# Patient Record
Sex: FEMALE | Race: WHITE
[De-identification: names, ages, dates, MRNs, and addresses within clinical notes are randomized per-mention and may not be internally consistent; named-entity substitution may affect disease eponyms.]

---

## 2019-03-13 ENCOUNTER — HOSPITAL ENCOUNTER (INPATIENT)
Dept: HOSPITAL 54 - ER | Age: 39
LOS: 2 days | Discharge: HOME | DRG: 760 | End: 2019-03-15
Attending: FAMILY MEDICINE | Admitting: NURSE PRACTITIONER
Payer: SELF-PAY

## 2019-03-13 VITALS — DIASTOLIC BLOOD PRESSURE: 74 MMHG | SYSTOLIC BLOOD PRESSURE: 114 MMHG

## 2019-03-13 VITALS — DIASTOLIC BLOOD PRESSURE: 65 MMHG | SYSTOLIC BLOOD PRESSURE: 108 MMHG

## 2019-03-13 VITALS — DIASTOLIC BLOOD PRESSURE: 70 MMHG | SYSTOLIC BLOOD PRESSURE: 110 MMHG

## 2019-03-13 VITALS — SYSTOLIC BLOOD PRESSURE: 108 MMHG | DIASTOLIC BLOOD PRESSURE: 68 MMHG

## 2019-03-13 VITALS — DIASTOLIC BLOOD PRESSURE: 82 MMHG | SYSTOLIC BLOOD PRESSURE: 108 MMHG

## 2019-03-13 VITALS — SYSTOLIC BLOOD PRESSURE: 108 MMHG | DIASTOLIC BLOOD PRESSURE: 64 MMHG

## 2019-03-13 VITALS — DIASTOLIC BLOOD PRESSURE: 65 MMHG | SYSTOLIC BLOOD PRESSURE: 110 MMHG

## 2019-03-13 VITALS — DIASTOLIC BLOOD PRESSURE: 70 MMHG | SYSTOLIC BLOOD PRESSURE: 108 MMHG

## 2019-03-13 VITALS — WEIGHT: 132 LBS | HEIGHT: 68 IN | BODY MASS INDEX: 20 KG/M2

## 2019-03-13 DIAGNOSIS — N84.0: ICD-10-CM

## 2019-03-13 DIAGNOSIS — N83.201: ICD-10-CM

## 2019-03-13 DIAGNOSIS — N83.202: ICD-10-CM

## 2019-03-13 DIAGNOSIS — D62: ICD-10-CM

## 2019-03-13 DIAGNOSIS — N93.9: Primary | ICD-10-CM

## 2019-03-13 DIAGNOSIS — N88.8: ICD-10-CM

## 2019-03-13 LAB
ALBUMIN SERPL BCP-MCNC: 4.1 G/DL (ref 3.4–5)
ALP SERPL-CCNC: 59 U/L (ref 46–116)
ALT SERPL W P-5'-P-CCNC: 18 U/L (ref 12–78)
AST SERPL W P-5'-P-CCNC: 13 U/L (ref 15–37)
BASOPHILS # BLD AUTO: 0.1 /CMM (ref 0–0.2)
BASOPHILS NFR BLD AUTO: 1.1 % (ref 0–2)
BILIRUB DIRECT SERPL-MCNC: 0.1 MG/DL (ref 0–0.2)
BILIRUB SERPL-MCNC: 0.3 MG/DL (ref 0.2–1)
BUN SERPL-MCNC: 10 MG/DL (ref 7–18)
CALCIUM SERPL-MCNC: 9.2 MG/DL (ref 8.5–10.1)
CHLORIDE SERPL-SCNC: 103 MMOL/L (ref 98–107)
CO2 SERPL-SCNC: 26 MMOL/L (ref 21–32)
CREAT SERPL-MCNC: 0.6 MG/DL (ref 0.6–1.3)
EOSINOPHIL NFR BLD AUTO: 2.6 % (ref 0–6)
GLUCOSE SERPL-MCNC: 91 MG/DL (ref 74–106)
HCT VFR BLD AUTO: 23 % (ref 33–45)
HCT VFR BLD AUTO: 26 % (ref 33–45)
HGB BLD-MCNC: 6.5 G/DL (ref 11.5–14.8)
HGB BLD-MCNC: 7.2 G/DL (ref 11.5–14.8)
LYMPHOCYTES NFR BLD AUTO: 2.5 /CMM (ref 0.8–4.8)
LYMPHOCYTES NFR BLD AUTO: 35.3 % (ref 20–44)
MCHC RBC AUTO-ENTMCNC: 28 G/DL (ref 31–36)
MCV RBC AUTO: 60 FL (ref 82–100)
MONOCYTES NFR BLD AUTO: 0.4 /CMM (ref 0.1–1.3)
MONOCYTES NFR BLD AUTO: 6.1 % (ref 2–12)
NEUTROPHILS # BLD AUTO: 3.8 /CMM (ref 1.8–8.9)
NEUTROPHILS NFR BLD AUTO: 54.9 % (ref 43–81)
PLATELET # BLD AUTO: 436 /CMM (ref 150–450)
POTASSIUM SERPL-SCNC: 3.6 MMOL/L (ref 3.5–5.1)
PROT SERPL-MCNC: 8.2 G/DL (ref 6.4–8.2)
RBC # BLD AUTO: 4.29 MIL/UL (ref 4–5.2)
SODIUM SERPL-SCNC: 139 MMOL/L (ref 136–145)
WBC NRBC COR # BLD AUTO: 7 K/UL (ref 4.3–11)

## 2019-03-13 PROCEDURE — 30233N1 TRANSFUSION OF NONAUTOLOGOUS RED BLOOD CELLS INTO PERIPHERAL VEIN, PERCUTANEOUS APPROACH: ICD-10-PCS | Performed by: FAMILY MEDICINE

## 2019-03-13 PROCEDURE — G0378 HOSPITAL OBSERVATION PER HR: HCPCS

## 2019-03-13 NOTE — NUR
-------------------------------------------------------------------------------

          *** Note undone in EDM - 03/13/19 at 2016 by PERLA ***           

-------------------------------------------------------------------------------

REPORT GIVEN TO RN RVI FOR RANDA, IV NS STILL INFUSING.

## 2019-03-13 NOTE — NUR
RECEIVED PATIENT FROM ER FOR DX VAGINAL BLEEDING. AO X 3, ABLE TO MAKE NEEDS KNOWN. NO ACUTE 
DISTRESS NOTED. DENIES ANY PAIN AT THIS TIME. VERY SCANT VAGINAL BLEEDING NOTED AT THIS 
TIME. SKIN INTACT. TELE READING SINUS RHYTHM HR 79. IV SITE PATENT, INTACT; FLUSHED. SAFETY 
REMINDERS GIVEN. ORIENTATION TO ROOM AND UNIT GIVEN TO PATIENT. ON LOW BED WITH BILATERAL 
UPPER SIDE RAILS UP. CALL BELL WITHIN EASY REACH. WILL CONTINUE TO MONITOR.

## 2019-03-13 NOTE — NUR
PT BIB SELF C/O vaginal bleeding x 7 dayS, PT IS AAOX4, NOT IN RESPIRATORY 
DISTRESS, V/S STABLE, KEPT RESTED AND COMFORTABLE.

## 2019-03-14 VITALS — DIASTOLIC BLOOD PRESSURE: 68 MMHG | SYSTOLIC BLOOD PRESSURE: 117 MMHG

## 2019-03-14 VITALS — SYSTOLIC BLOOD PRESSURE: 110 MMHG | DIASTOLIC BLOOD PRESSURE: 68 MMHG

## 2019-03-14 VITALS — DIASTOLIC BLOOD PRESSURE: 68 MMHG | SYSTOLIC BLOOD PRESSURE: 108 MMHG

## 2019-03-14 VITALS — DIASTOLIC BLOOD PRESSURE: 68 MMHG | SYSTOLIC BLOOD PRESSURE: 114 MMHG

## 2019-03-14 VITALS — SYSTOLIC BLOOD PRESSURE: 118 MMHG | DIASTOLIC BLOOD PRESSURE: 72 MMHG

## 2019-03-14 VITALS — SYSTOLIC BLOOD PRESSURE: 116 MMHG | DIASTOLIC BLOOD PRESSURE: 71 MMHG

## 2019-03-14 VITALS — SYSTOLIC BLOOD PRESSURE: 107 MMHG | DIASTOLIC BLOOD PRESSURE: 64 MMHG

## 2019-03-14 LAB
BASOPHILS # BLD AUTO: 0.1 /CMM (ref 0–0.2)
BASOPHILS NFR BLD AUTO: 1.4 % (ref 0–2)
BUN SERPL-MCNC: 8 MG/DL (ref 7–18)
CALCIUM SERPL-MCNC: 8.2 MG/DL (ref 8.5–10.1)
CHLORIDE SERPL-SCNC: 107 MMOL/L (ref 98–107)
CHOLEST SERPL-MCNC: 126 MG/DL (ref ?–200)
CO2 SERPL-SCNC: 24 MMOL/L (ref 21–32)
CREAT SERPL-MCNC: 0.5 MG/DL (ref 0.6–1.3)
EOSINOPHIL NFR BLD AUTO: 4.9 % (ref 0–6)
GLUCOSE SERPL-MCNC: 81 MG/DL (ref 74–106)
HCT VFR BLD AUTO: 24 % (ref 33–45)
HCT VFR BLD AUTO: 25 % (ref 33–45)
HCT VFR BLD AUTO: 28 % (ref 33–45)
HDLC SERPL-MCNC: 78 MG/DL (ref 40–60)
HGB BLD-MCNC: 7.3 G/DL (ref 11.5–14.8)
HGB BLD-MCNC: 7.5 G/DL (ref 11.5–14.8)
HGB BLD-MCNC: 8.4 G/DL (ref 11.5–14.8)
LDLC SERPL DIRECT ASSAY-MCNC: 51 MG/DL (ref 0–99)
LYMPHOCYTES NFR BLD AUTO: 2.2 /CMM (ref 0.8–4.8)
LYMPHOCYTES NFR BLD AUTO: 33.4 % (ref 20–44)
LYMPHOCYTES NFR BLD MANUAL: 28 % (ref 16–48)
MAGNESIUM SERPL-MCNC: 1.8 MG/DL (ref 1.8–2.4)
MCHC RBC AUTO-ENTMCNC: 30 G/DL (ref 31–36)
MCV RBC AUTO: 62 FL (ref 82–100)
MONOCYTES NFR BLD AUTO: 0.6 /CMM (ref 0.1–1.3)
MONOCYTES NFR BLD AUTO: 9.5 % (ref 2–12)
MONOCYTES NFR BLD MANUAL: 7 % (ref 0–11)
NEUTROPHILS # BLD AUTO: 3.4 /CMM (ref 1.8–8.9)
NEUTROPHILS NFR BLD AUTO: 50.8 % (ref 43–81)
NEUTS SEG NFR BLD MANUAL: 65 % (ref 42–76)
PHOSPHATE SERPL-MCNC: 3.2 MG/DL (ref 2.5–4.9)
PLATELET # BLD AUTO: 312 /CMM (ref 150–450)
POTASSIUM SERPL-SCNC: 3.7 MMOL/L (ref 3.5–5.1)
RBC # BLD AUTO: 4.01 MIL/UL (ref 4–5.2)
SODIUM SERPL-SCNC: 141 MMOL/L (ref 136–145)
TRIGL SERPL-MCNC: 69 MG/DL (ref 30–150)
TSH SERPL DL<=0.005 MIU/L-ACNC: 3.13 UIU/ML (ref 0.36–3.74)
WBC NRBC COR # BLD AUTO: 6.6 K/UL (ref 4.3–11)

## 2019-03-14 RX ADMIN — PANTOPRAZOLE SODIUM SCH MG: 40 TABLET, DELAYED RELEASE ORAL at 09:20

## 2019-03-14 RX ADMIN — Medication SCH MG: at 09:20

## 2019-03-14 RX ADMIN — Medication SCH MG: at 18:12

## 2019-03-14 RX ADMIN — MEGESTROL ACETATE SCH MG: 40 TABLET ORAL at 09:20

## 2019-03-14 NOTE — NUR
RECEIVED PATIENT IN BED AWAKE. AO X 3, ABLE TO MAKE NEEDS KNOWN. NO ACUTE DISTRESS NOTED. 
DENIES ANY PAIN AT THIS TIME. NOTED TO HAVE VERY SCANT BLEEDING AT THIS TIME. IV SITE 
PATENT, INTACT; FLUSHED.  SAFETY REMINDERS GIVEN. ON LOW BED WITH BILATERAL UPPER SIDE RAILS 
UP. CALL BELL WITHIN EASY REACH. WILL CONTINUE TO MONITOR.

## 2019-03-14 NOTE — NUR
PER BENJI SHARP, ORDER H/H TO BE DRAWN ONE HOUR AFTER BLOOD TRANSFUSION AND Q 4 HOURS 
THEREAFTER, NOTED AND CARRIED OUT. PATIENT MADE AWARE.

## 2019-03-14 NOTE — NUR
ms rn

receive don bed, awake,alert,oriented x4,not in any form of distress noted, came in w/ 
vaginal bleeding, no active bleeding at this time. lungs are clear,abdomen soft,positive 
bowel sounds, denies pain at this time, all needs attended.

## 2019-03-14 NOTE — NUR
PATIENT ASLEEP, EASILY AROUSABLE. RESPIRATIONS EVEN. NO SIGNS OF PAIN NOTED.  PATIENT 
TOLERATED BLOOD TRANSFUSION WITH NO ADVERSE REACTION. H/H ONE HOUR AFTER TRANSFUSION IS 
7.3/24. NEEDS ATTENDED. SAFETY PRECAUTIONS AND COMFORT MEASURES IN PLACE. WILL GIVE REPORT 
TO DAY SHIFT FOR CONTINUITY OF CARE.

## 2019-03-15 VITALS — DIASTOLIC BLOOD PRESSURE: 67 MMHG | SYSTOLIC BLOOD PRESSURE: 107 MMHG

## 2019-03-15 LAB
BASOPHILS # BLD AUTO: 0.1 /CMM (ref 0–0.2)
BASOPHILS NFR BLD AUTO: 1.7 % (ref 0–2)
BUN SERPL-MCNC: 9 MG/DL (ref 7–18)
CALCIUM SERPL-MCNC: 8.6 MG/DL (ref 8.5–10.1)
CHLORIDE SERPL-SCNC: 106 MMOL/L (ref 98–107)
CO2 SERPL-SCNC: 25 MMOL/L (ref 21–32)
CREAT SERPL-MCNC: 0.6 MG/DL (ref 0.6–1.3)
EOSINOPHIL NFR BLD AUTO: 8.4 % (ref 0–6)
EOSINOPHIL NFR BLD MANUAL: 9 % (ref 0–4)
GLUCOSE SERPL-MCNC: 76 MG/DL (ref 74–106)
HCT VFR BLD AUTO: 26 % (ref 33–45)
HGB BLD-MCNC: 7.6 G/DL (ref 11.5–14.8)
LYMPHOCYTES NFR BLD AUTO: 1.9 /CMM (ref 0.8–4.8)
LYMPHOCYTES NFR BLD AUTO: 29.2 % (ref 20–44)
LYMPHOCYTES NFR BLD MANUAL: 27 % (ref 16–48)
MCHC RBC AUTO-ENTMCNC: 30 G/DL (ref 31–36)
MCV RBC AUTO: 62 FL (ref 82–100)
MONOCYTES NFR BLD AUTO: 0.7 /CMM (ref 0.1–1.3)
MONOCYTES NFR BLD AUTO: 11.3 % (ref 2–12)
MONOCYTES NFR BLD MANUAL: 12 % (ref 0–11)
NEUTROPHILS # BLD AUTO: 3.1 /CMM (ref 1.8–8.9)
NEUTROPHILS NFR BLD AUTO: 49.4 % (ref 43–81)
NEUTS BAND NFR BLD MANUAL: 1 % (ref 0–5)
NEUTS SEG NFR BLD MANUAL: 51 % (ref 42–76)
PLATELET # BLD AUTO: 316 /CMM (ref 150–450)
POTASSIUM SERPL-SCNC: 3.5 MMOL/L (ref 3.5–5.1)
RBC # BLD AUTO: 4.13 MIL/UL (ref 4–5.2)
SODIUM SERPL-SCNC: 141 MMOL/L (ref 136–145)
WBC NRBC COR # BLD AUTO: 6.3 K/UL (ref 4.3–11)

## 2019-03-15 RX ADMIN — Medication SCH MG: at 08:52

## 2019-03-15 RX ADMIN — MEGESTROL ACETATE SCH MG: 40 TABLET ORAL at 08:52

## 2019-03-15 RX ADMIN — PANTOPRAZOLE SODIUM SCH MG: 40 TABLET, DELAYED RELEASE ORAL at 08:52

## 2019-03-15 NOTE — NUR
PATIENT ASLEEP, EASILY AROUSABLE. RESPIRATIONS EVEN. NO SIGNS OF PAIN NOTED. NEEDS ATTENDED. 
SAFETY PRECAUTIONS AND COMFORT MEASURES IN PLACE. WILL GIVE REPORT TO DAY SHIFT FOR 
CONTINUITY OF CARE.

## 2019-03-15 NOTE — NUR
RN NOTES

PATIENT A/OX4, BREATHING EVEN AND UNLABORED, PER PATIENT SHE DOESN'T HAVE ANY EPISODES OF 
VAGINAL BLEEDING ANYMORE. DENIES PAIN OR DISCOMFORT, CALL LIGHT WITHIN REACH, WILL CONTINUE 
TO MONITOR.

## 2019-03-15 NOTE — NUR
RN DISCHARGE

PATIENT SEEN BY DR. TONEY AND CLEARED FOR DISCHARGE. PATIENT DENIES VAGINAL BLEEDING AT T 
HIS TIME, RESOURCES GIVEN TO PATIENT, RE: WALK IN CLINIC AT [facility]. PATIENT'S DISCHARGE 
INSTRUCTIONS PROVIDED AND VERBALIZED UNDERSTANDING. PIV REMOVED, BELONGINGS RECONCILED 
INCLUDING PATIENT'S MONEY, ALL COMPLETE. INFORMED PATIENT TO FOLLOW UP WITH PRIMARY MD AND 
OB POSSIBLY WITHIN 3-5 DAYS RE: BLEEDING AND ANEMIA. PATIENT UNDERSTOOD INSTRUCTIONS. 
PATIENT LEFT THE FACILITY IN STABLE CONDITION.

## 2022-04-14 ENCOUNTER — HOSPITAL ENCOUNTER (EMERGENCY)
Dept: HOSPITAL 12 - ER | Age: 42
Discharge: HOME | End: 2022-04-14
Payer: COMMERCIAL

## 2022-04-14 VITALS — HEIGHT: 68 IN | BODY MASS INDEX: 19.1 KG/M2 | WEIGHT: 126 LBS

## 2022-04-14 VITALS — SYSTOLIC BLOOD PRESSURE: 135 MMHG | DIASTOLIC BLOOD PRESSURE: 88 MMHG

## 2022-04-14 DIAGNOSIS — N93.8: Primary | ICD-10-CM

## 2022-04-14 LAB
BUN SERPL-MCNC: 14 MG/DL (ref 7–18)
CHLORIDE SERPL-SCNC: 104 MMOL/L (ref 98–107)
CO2 SERPL-SCNC: 26 MMOL/L (ref 21–32)
CREAT SERPL-MCNC: 0.8 MG/DL (ref 0.6–1.3)
GLUCOSE SERPL-MCNC: 83 MG/DL (ref 74–106)
HCT VFR BLD AUTO: 32.5 % (ref 31.2–41.9)
MCH RBC QN AUTO: 22.4 UUG (ref 24.7–32.8)
MCV RBC AUTO: 71.3 FL (ref 75.5–95.3)
PLATELET # BLD AUTO: 351 K/UL (ref 179–408)
POTASSIUM SERPL-SCNC: 3.5 MMOL/L (ref 3.5–5.1)

## 2022-04-14 PROCEDURE — A4663 DIALYSIS BLOOD PRESSURE CUFF: HCPCS
